# Patient Record
(demographics unavailable — no encounter records)

---

## 2017-02-22 NOTE — NON STRESS TEST REPORT
=================================================================

Non Stress Test

=================================================================

Datetime Report Generated by CPN: 02/22/2017 13:29

   

   

=================================================================

DEMOGRAPHIC

=================================================================

   

EGA NST:  35.0

   

=================================================================

INDICATION

=================================================================

   

Indication for Study:  Ordered by Provider

Indication for Study (NST) Other:  Repeat from office for methadone

   

=================================================================

MONITORING

=================================================================

   

Monitor Explained:  Monitor Explained; Test Explained; Patient

   Verbalized Understanding

Time on Monitor:  02/22/2017 12:53

Time off Monitor:  02/22/2017 13:25

NST Duration:  32

   

=================================================================

NST INTERVENTIONS

=================================================================

   

NST Interventions:  Reposition Patient

Physician Notified NST:  C SALOMON, CNM REVIEWED STRIP

BABY A:  [id number]

   

=================================================================

BABY A

=================================================================

   

Fetal Movement :  Present

Contraction Frequency :  0

FHR Baseline :  120

Accelerations :  15X15

Accelerations :  15X15

Decelerations :  None

Decelerations :  None

Variability :  Moderate 6-25bpm

Variability :  Moderate 6-25bpm

NST Review:  Meets Criteria for Reactive NST

NST Review and Verified By :  NUSRAT GUNDERSON RN

NSSHERYR Results:  Reactive

NST Results:  Reactive

   

=================================================================

NST REPORT

=================================================================

   

Report Trigger:  Send Report